# Patient Record
Sex: FEMALE | Race: WHITE
[De-identification: names, ages, dates, MRNs, and addresses within clinical notes are randomized per-mention and may not be internally consistent; named-entity substitution may affect disease eponyms.]

---

## 2020-10-16 ENCOUNTER — HOSPITAL ENCOUNTER (OUTPATIENT)
Dept: HOSPITAL 62 - OD | Age: 70
End: 2020-10-16
Attending: ORTHOPAEDIC SURGERY
Payer: MEDICARE

## 2020-10-16 DIAGNOSIS — I10: Primary | ICD-10-CM

## 2020-10-16 DIAGNOSIS — Z11.2: ICD-10-CM

## 2020-10-16 LAB
ADD MANUAL DIFF: NO
ALBUMIN SERPL-MCNC: 4.4 G/DL (ref 3.5–5)
ALP SERPL-CCNC: 98 U/L (ref 38–126)
ANION GAP SERPL CALC-SCNC: 11 MMOL/L (ref 5–19)
AST SERPL-CCNC: 35 U/L (ref 14–36)
BASOPHILS # BLD AUTO: 0.1 10^3/UL (ref 0–0.2)
BASOPHILS NFR BLD AUTO: 0.9 % (ref 0–2)
BILIRUB DIRECT SERPL-MCNC: 0.4 MG/DL (ref 0–0.4)
BILIRUB SERPL-MCNC: 0.4 MG/DL (ref 0.2–1.3)
BUN SERPL-MCNC: 20 MG/DL (ref 7–20)
CALCIUM: 9.6 MG/DL (ref 8.4–10.2)
CHLORIDE SERPL-SCNC: 99 MMOL/L (ref 98–107)
CO2 SERPL-SCNC: 28 MMOL/L (ref 22–30)
EOSINOPHIL # BLD AUTO: 0.2 10^3/UL (ref 0–0.6)
EOSINOPHIL NFR BLD AUTO: 2.2 % (ref 0–6)
ERYTHROCYTE [DISTWIDTH] IN BLOOD BY AUTOMATED COUNT: 12.6 % (ref 11.5–14)
GLUCOSE SERPL-MCNC: 91 MG/DL (ref 75–110)
HCT VFR BLD CALC: 39.4 % (ref 36–47)
HGB BLD-MCNC: 13.5 G/DL (ref 12–15.5)
LYMPHOCYTES # BLD AUTO: 1.8 10^3/UL (ref 0.5–4.7)
LYMPHOCYTES NFR BLD AUTO: 22.5 % (ref 13–45)
MCH RBC QN AUTO: 31.5 PG (ref 27–33.4)
MCHC RBC AUTO-ENTMCNC: 34.3 G/DL (ref 32–36)
MCV RBC AUTO: 92 FL (ref 80–97)
MONOCYTES # BLD AUTO: 0.5 10^3/UL (ref 0.1–1.4)
MONOCYTES NFR BLD AUTO: 6.4 % (ref 3–13)
NEUTROPHILS # BLD AUTO: 5.3 10^3/UL (ref 1.7–8.2)
NEUTS SEG NFR BLD AUTO: 68 % (ref 42–78)
PLATELET # BLD: 259 10^3/UL (ref 150–450)
POTASSIUM SERPL-SCNC: 4.9 MMOL/L (ref 3.6–5)
PROT SERPL-MCNC: 7.2 G/DL (ref 6.3–8.2)
RBC # BLD AUTO: 4.29 10^6/UL (ref 3.72–5.28)
TOTAL CELLS COUNTED % (AUTO): 100 %
WBC # BLD AUTO: 7.8 10^3/UL (ref 4–10.5)

## 2020-10-16 PROCEDURE — 36415 COLL VENOUS BLD VENIPUNCTURE: CPT

## 2020-10-16 PROCEDURE — 80053 COMPREHEN METABOLIC PANEL: CPT

## 2020-10-16 PROCEDURE — 87070 CULTURE OTHR SPECIMN AEROBIC: CPT

## 2020-10-16 PROCEDURE — 85025 COMPLETE CBC W/AUTO DIFF WBC: CPT

## 2020-10-28 ENCOUNTER — HOSPITAL ENCOUNTER (EMERGENCY)
Dept: HOSPITAL 62 - ER | Age: 70
Discharge: HOME | End: 2020-10-28
Payer: MEDICARE

## 2020-10-28 VITALS — DIASTOLIC BLOOD PRESSURE: 55 MMHG | SYSTOLIC BLOOD PRESSURE: 121 MMHG

## 2020-10-28 DIAGNOSIS — R06.02: ICD-10-CM

## 2020-10-28 DIAGNOSIS — Z98.890: ICD-10-CM

## 2020-10-28 DIAGNOSIS — Z87.891: ICD-10-CM

## 2020-10-28 DIAGNOSIS — I10: ICD-10-CM

## 2020-10-28 DIAGNOSIS — J44.9: ICD-10-CM

## 2020-10-28 DIAGNOSIS — R06.09: Primary | ICD-10-CM

## 2020-10-28 LAB
ADD MANUAL DIFF: NO
ALBUMIN SERPL-MCNC: 4.3 G/DL (ref 3.5–5)
ALP SERPL-CCNC: 88 U/L (ref 38–126)
ANION GAP SERPL CALC-SCNC: 10 MMOL/L (ref 5–19)
APTT BLD: 24.6 SEC (ref 23.5–35.8)
AST SERPL-CCNC: 39 U/L (ref 14–36)
BASOPHILS # BLD AUTO: 0 10^3/UL (ref 0–0.2)
BASOPHILS NFR BLD AUTO: 0.2 % (ref 0–2)
BILIRUB DIRECT SERPL-MCNC: 0.2 MG/DL (ref 0–0.4)
BILIRUB SERPL-MCNC: 0.5 MG/DL (ref 0.2–1.3)
BUN SERPL-MCNC: 14 MG/DL (ref 7–20)
CALCIUM: 9.6 MG/DL (ref 8.4–10.2)
CHLORIDE SERPL-SCNC: 98 MMOL/L (ref 98–107)
CO2 SERPL-SCNC: 31 MMOL/L (ref 22–30)
EOSINOPHIL # BLD AUTO: 0.1 10^3/UL (ref 0–0.6)
EOSINOPHIL NFR BLD AUTO: 1.5 % (ref 0–6)
ERYTHROCYTE [DISTWIDTH] IN BLOOD BY AUTOMATED COUNT: 12.5 % (ref 11.5–14)
GLUCOSE SERPL-MCNC: 135 MG/DL (ref 75–110)
HCT VFR BLD CALC: 38.3 % (ref 36–47)
HGB BLD-MCNC: 13.3 G/DL (ref 12–15.5)
INR PPP: 0.93
LYMPHOCYTES # BLD AUTO: 0.9 10^3/UL (ref 0.5–4.7)
LYMPHOCYTES NFR BLD AUTO: 11.1 % (ref 13–45)
MCH RBC QN AUTO: 32.2 PG (ref 27–33.4)
MCHC RBC AUTO-ENTMCNC: 34.8 G/DL (ref 32–36)
MCV RBC AUTO: 92 FL (ref 80–97)
MONOCYTES # BLD AUTO: 0.5 10^3/UL (ref 0.1–1.4)
MONOCYTES NFR BLD AUTO: 6.5 % (ref 3–13)
NEUTROPHILS # BLD AUTO: 6.7 10^3/UL (ref 1.7–8.2)
NEUTS SEG NFR BLD AUTO: 80.7 % (ref 42–78)
PLATELET # BLD: 233 10^3/UL (ref 150–450)
POTASSIUM SERPL-SCNC: 4 MMOL/L (ref 3.6–5)
PROT SERPL-MCNC: 7 G/DL (ref 6.3–8.2)
PROTHROMBIN TIME: 12.7 SEC (ref 11.4–15.4)
RBC # BLD AUTO: 4.14 10^6/UL (ref 3.72–5.28)
TOTAL CELLS COUNTED % (AUTO): 100 %
WBC # BLD AUTO: 8.3 10^3/UL (ref 4–10.5)

## 2020-10-28 PROCEDURE — 85730 THROMBOPLASTIN TIME PARTIAL: CPT

## 2020-10-28 PROCEDURE — 71275 CT ANGIOGRAPHY CHEST: CPT

## 2020-10-28 PROCEDURE — 85610 PROTHROMBIN TIME: CPT

## 2020-10-28 PROCEDURE — 80053 COMPREHEN METABOLIC PANEL: CPT

## 2020-10-28 PROCEDURE — 84484 ASSAY OF TROPONIN QUANT: CPT

## 2020-10-28 PROCEDURE — 99285 EMERGENCY DEPT VISIT HI MDM: CPT

## 2020-10-28 PROCEDURE — 83880 ASSAY OF NATRIURETIC PEPTIDE: CPT

## 2020-10-28 PROCEDURE — 36415 COLL VENOUS BLD VENIPUNCTURE: CPT

## 2020-10-28 PROCEDURE — 85025 COMPLETE CBC W/AUTO DIFF WBC: CPT

## 2020-10-28 PROCEDURE — 93010 ELECTROCARDIOGRAM REPORT: CPT

## 2020-10-28 PROCEDURE — 71045 X-RAY EXAM CHEST 1 VIEW: CPT

## 2020-10-28 PROCEDURE — 93005 ELECTROCARDIOGRAM TRACING: CPT

## 2020-10-28 NOTE — RADIOLOGY REPORT (SQ)
EXAM DESCRIPTION:  CHEST SINGLE VIEW



IMAGES COMPLETED DATE/TIME:  10/28/2020 11:07 am



REASON FOR STUDY:  SOB, s/p surgery, requiring O2



COMPARISON:  10/7/2016



EXAM PARAMETERS:  NUMBER OF VIEWS: One view.

TECHNIQUE: Single frontal radiographic view of the chest acquired.

RADIATION DOSE: NA

LIMITATIONS: None.



FINDINGS:  LUNGS AND PLEURA: Bilateral perihilar and basilar interstitial airspace disease most likel
y representing interstitial edema.  Possible small left effusion.  No consolidation.

MEDIASTINUM AND HILAR STRUCTURES: No masses.  Contour normal.

HEART AND VASCULAR STRUCTURES: Heart size is normal.  There is central vascular prominence.

BONES: No acute findings.

HARDWARE: None in the chest.

OTHER: No other significant finding.



IMPRESSION:  Bilateral perihilar and basilar interstitial airspace disease most likely interstitial e
nishant.



TECHNICAL DOCUMENTATION:  JOB ID:  [id number]

 2011 [employer]- All Rights Reserved



Reading location - IP/workstation name: [email or web address]

## 2020-10-28 NOTE — ER DOCUMENT REPORT
ED General





- General


Chief Complaint: Shortness Of Breath


Stated Complaint: SHORTNESS OF BREATH


Time Seen by Provider: 10/28/20 10:11


Primary Care Provider: 


IFRAH AMAYA MD [Primary Care Provider] - Follow up as needed


Mode of Arrival: Ambulatory


Information source: Patient


TRAVEL OUTSIDE OF THE [place] IN LAST 30 DAYS: No





- HPI


Notes: 





Patient is sent over from her primary care doctor's office secondary to 

shortness of breath.  Patient states that she had left shoulder surgery 

yesterday at another hospital.  She states that there was a concern that they 

may have blocked her phrenic nerve when they tried to do a shoulder block.  She 

states she has had some trouble getting comfortable all night but more because 

she was unable to lay on her shoulder then because she was short of breath.  She

went to her primary care doctor's office today telling them that she felt short 

of breath and they referred her to the emergency department.  She denies any 

fever sweats chills.  No significant coughing.  Her shortness of breath has been

mild to moderate.  Is worse with exertion and better with rest.  It has been 

intermittent.  She states she feels better now than she did this morning.  And 

that is gradually improving.





- Related Data


Allergies/Adverse Reactions: 


                                        





No Known Allergies Allergy (Verified 10/28/20 10:04)


   











Past Medical History





- General


Information source: Patient





- Social History


Smoking Status: Former Smoker


Chew tobacco use (# tins/day): No


Frequency of alcohol use: wine


Drug Abuse: None


Family History: Reviewed & Not Pertinent


Patient has homicidal ideation: No





- Past Medical History


Cardiac Medical History: Reports: Hx Hypertension


Pulmonary Medical History: Reports: Hx COPD


Psychiatric Medical History: Reports: Hx Anxiety





- Immunizations


Hx Diphtheria, Pertussis, Tetanus Vaccination: No





Review of Systems





- Review of Systems


Constitutional: denies: Chills, Fever


Cardiovascular: denies: Chest pain, Palpitations


Respiratory: Short of breath.  denies: Hemoptysis


-: Yes All other systems reviewed and negative





Physical Exam





- Vital signs


Vitals: 





                                        











Temp Pulse Resp BP Pulse Ox


 


 97.8 F   87   18   121/55 L  98 


 


 10/28/20 09:53  10/28/20 09:53  10/28/20 09:53  10/28/20 09:53  10/28/20 09:53











Interpretation: Normal





- General


General appearance: Appears well, Alert





- HEENT


Head: Normocephalic, Atraumatic


Eyes: Normal


Pupils: PERRL





- Respiratory


Respiratory status: No respiratory distress


Chest status: Nontender


Breath sounds: Decreased air movement


Chest palpation: Normal





- Cardiovascular


Rhythm: Regular


Heart sounds: Normal auscultation


Murmur: No





- Abdominal


Inspection: Normal


Distension: No distension


Bowel sounds: Normal


Tenderness: Nontender


Organomegaly: No organomegaly





- Back


Back: Normal, Nontender





- Extremities


General upper extremity: Other - Left arm in sling.  Otherwise unremarkable 

upper extremity exam


General lower extremity: Normal inspection, Nontender, Normal color, Normal ROM,

Normal temperature, Normal weight bearing.  No: Hamida's sign





- Neurological


Neuro grossly intact: Yes


Cognition: Normal


Orientation: AAOx4


[place] Coma Scale Eye Opening: Spontaneous


[place] Coma Scale Verbal: Oriented


[place] Coma Scale Motor: Obeys Commands


[place] Coma Scale Total: 15


Speech: Normal


Motor strength normal: LUE, RUE, LLE, RLE


Sensory: Normal





- Psychological


Associated symptoms: Normal affect, Normal mood





- Skin


Skin Temperature: Warm


Skin Moisture: Dry


Skin Color: Normal





Course





- Re-evaluation


Re-evalutation: 





10/28/20 14:43


Patient presents with shortness of breath after having had left shoulder surgery

yesterday.  It does appear possible that there was some phrenic nerve paralysis 

on the left side.  Patient does have a high riding diaphragm on the left on the 

CT scan.  She also has more atelectasis at the left base than the right.  In 

addition she is gradually been feeling better and states she now feels back to 

her normal state of health.  Currently her oxygen saturations are 96% on room 

air.  She is not tachycardic.  Her blood pressure is stable.  Chest x-ray was 

read as showing possible interstitial edema however when the CT was obtained I 

personally discussed this with the radiologist.  He states it no longer appears 

that this is interstitial edema.  Also her presentation does not fit that of 

flash pulmonary edema.





- Vital Signs


Vital signs: 





                                        











Temp Pulse Resp BP Pulse Ox


 


 97.8 F   87   18   121/55 L  98 


 


 10/28/20 09:53  10/28/20 09:53  10/28/20 09:53  10/28/20 09:53  10/28/20 09:53














- Laboratory


Result Diagrams: 


                                 10/28/20 10:22





                                 10/28/20 10:22


Laboratory results interpreted by me: 





                                        











  10/28/20 10/28/20 10/28/20





  10:22 10:22 10:22


 


Lymph % (Auto)  11.1 L  


 


Seg Neutrophils %  80.7 H  


 


Carbon Dioxide   31 H 


 


Glucose   135 H 


 


AST   39 H 


 


ALT   56 H 


 


NT-Pro-B Natriuret Pep    355 H














- Diagnostic Test


Radiology reviewed: Image reviewed, Reports reviewed





- EKG Interpretation by Me


EKG shows normal: Sinus rhythm


Rate: Normal - 81


Rhythm: NSR


Voltage: Consistent with LVH





Discharge





- Discharge


Clinical Impression: 


Dyspnea


Qualifiers:


 Dyspnea type: other forms of dyspnea Qualified Code(s): R06.09 - Other forms of

dyspnea





Condition: Stable


Disposition: HOME, SELF-CARE


Additional Instructions: 


Please follow up with orthopedics as scheduled

## 2020-10-28 NOTE — EKG REPORT
SEVERITY:- ABNORMAL ECG -

SINUS RHYTHM

PROBABLE LVH WITH SECONDARY REPOL ABNRM

:

Confirmed by: Gokul Melgar MD 28-Oct-2020 12:48:50

## 2020-10-28 NOTE — ER DOCUMENT REPORT
ED Medical Screen (RME)





- General


Chief Complaint: Shortness Of Breath


Stated Complaint: SHORTNESS OF BREATH


Time Seen by Provider: 10/28/20 10:11


Primary Care Provider: 


IFRAH AMAYA MD [Primary Care Provider] - Follow up as needed


TRAVEL OUTSIDE OF THE [place] IN LAST 30 DAYS: No





- HPI


Notes: 





10/28/20 10:15


69-year-old female to the emergency department via EMS from her doctor's office 

with complaints of worsening shortness of breath since yesterday.  She had a 

rotator cuff repair as well as a spur grinding down yesterday by Dr. Amaya.  

She states that since the surgery she has been feeling short of breath.  She 

does not use oxygen at home.  She is using 2 L of oxygen here in the emergency 

department she was on her way to physical therapy when they decided to stop with

a doctor's office to inquire about her shortness of breath.  She states that 

when her physician sent her over here.  Denies any chest pain.  Denies any 

fevers.  Denies any nausea, diaphoresis.  I performed a brief medical screening 

exam on the patient determined that the patient needs further evaluation and 

management by main side provider.  I have placed initial orders to help expedite

care.





- Related Data


Allergies/Adverse Reactions: 


                                        





No Known Allergies Allergy (Verified 10/28/20 10:04)


   











Past Medical History





- Past Medical History


Cardiac Medical History: Reports: Hx Hypertension


Pulmonary Medical History: Reports: Hx COPD


Psychiatric Medical History: Reports: Hx Anxiety





- Immunizations


Hx Diphtheria, Pertussis, Tetanus Vaccination: No





Physical Exam





- Vital signs


Vitals: 





                                        











Temp Pulse Resp BP Pulse Ox


 


 97.8 F   87   18   121/55 L  98 


 


 10/28/20 09:53  10/28/20 09:53  10/28/20 09:53  10/28/20 09:53  10/28/20 09:53














Course





- Vital Signs


Vital signs: 





                                        











Temp Pulse Resp BP Pulse Ox


 


 97.8 F   87   18   121/55 L  98 


 


 10/28/20 09:53  10/28/20 09:53  10/28/20 09:53  10/28/20 09:53  10/28/20 09:53














Doctor's Discharge





- Discharge


Referrals: 


IFRAH AMAYA MD [Primary Care Provider] - Follow up as needed

## 2020-10-28 NOTE — RADIOLOGY REPORT (SQ)
EXAM DESCRIPTION:  CTA CHEST



IMAGES COMPLETED DATE/TIME:  10/28/2020 2:07 pm



REASON FOR STUDY:  hypoxia



COMPARISON:  Chest x-ray done earlier the same day.



TECHNIQUE:  CT scan of the chest performed using helical scanning technique with dynamic intravenous 
contrast injection.  Images reviewed with lung, soft tissue and bone windows.  Reconstructed coronal 
and sagittal MPR images reviewed.

Additional 3 dimensional post-processing performed to develop Maximal Intensity Projection images (MI
P).  All images stored on PACS.

All CT scanners at this facility use dose modulation, iterative reconstruction, and/or weight based d
osing when appropriate to reduce radiation dose to as low as reasonably achievable (ALARA).

[facility]: Dose Right  [facility]: CareDose    [facility]: Dose Right    [facility]: Teradose 4D    [facility]: Smart Technologies



CONTRAST TYPE AND DOSE:  contrast/concentration: Isovue 350.00 mmol/ml; Total Contrast Delivered: 62.
0 ml; Total Saline Delivered: 61.0 ml

Contrast bolus optimized for the pulmonary arteries. Not diagnostic for the aorta.



RENAL FUNCTION:  BUN 14, creatinine 0.61



RADIATION DOSE:  CT Rad equipment meets quality standard of care and radiation dose reduction techniq
ues were employed. CTDIvol: 9.9 - 24.9 mGy. DLP: 837 mGy-cm. .



LIMITATIONS:  None.



FINDINGS:  LUNGS AND PLEURA: Prominent interstitial markings.  No consolidation.  No effusions.  Mini
mal left basilar atelectasis.

AORTA AND GREAT VESSELS: No aneurysm.  Contrast bolus not optimized for the aorta.

HEART: No pericardial effusion. No significant coronary artery calcifications.

PULMONARY ARTERIES: No emboli visualized in the main pulmonary arteries or the segmental branches.

HILAR AND MEDIASTINAL STRUCTURES: No identified masses or abnormal nodes.

HARDWARE: None in the chest.

UPPER ABDOMEN: Hepatic steatosis.

THYROID AND OTHER SOFT TISSUES: No masses.  No adenopathy.

BONES: No acute or significant finding.

3D MIPS: Confirm above findings.

OTHER: No other significant finding.



IMPRESSION:  No pulmonary emboli.  Prominent interstitial markings.  Minimal left basilar atelectasis
.



COMMENT:  Quality ID # [id number]: Final reports with documentation of one or more dose reduction techniques
 (e.g., Automated exposure control, adjustment of the mA and/or kV according to patient size, use of 
iterative reconstruction technique)



TECHNICAL DOCUMENTATION:  JOB ID:  [id number]

 2011 [employer]- All Rights Reserved



Reading location - IP/workstation name: [email or web address]